# Patient Record
Sex: FEMALE | Race: WHITE | NOT HISPANIC OR LATINO | URBAN - METROPOLITAN AREA
[De-identification: names, ages, dates, MRNs, and addresses within clinical notes are randomized per-mention and may not be internally consistent; named-entity substitution may affect disease eponyms.]

---

## 2017-06-20 ENCOUNTER — OUTPATIENT (OUTPATIENT)
Dept: OUTPATIENT SERVICES | Facility: HOSPITAL | Age: 72
LOS: 1 days | End: 2017-06-20

## 2018-12-08 ENCOUNTER — TRANSCRIPTION ENCOUNTER (OUTPATIENT)
Age: 73
End: 2018-12-08

## 2023-07-12 PROBLEM — Z00.00 ENCOUNTER FOR PREVENTIVE HEALTH EXAMINATION: Status: ACTIVE | Noted: 2023-07-12

## 2023-08-04 ENCOUNTER — APPOINTMENT (OUTPATIENT)
Dept: ORTHOPEDIC SURGERY | Facility: CLINIC | Age: 78
End: 2023-08-04
Payer: MEDICARE

## 2023-08-04 VITALS — WEIGHT: 220 LBS | HEIGHT: 67 IN | BODY MASS INDEX: 34.53 KG/M2

## 2023-08-04 DIAGNOSIS — Q99.8 OTHER SPECIFIED CHROMOSOME ABNORMALITIES: ICD-10-CM

## 2023-08-04 DIAGNOSIS — Z86.69 PERSONAL HISTORY OF OTHER DISEASES OF THE NERVOUS SYSTEM AND SENSE ORGANS: ICD-10-CM

## 2023-08-04 DIAGNOSIS — G21.9 SECONDARY PARKINSONISM, UNSPECIFIED: ICD-10-CM

## 2023-08-04 PROCEDURE — 99213 OFFICE O/P EST LOW 20 MIN: CPT

## 2023-08-04 PROCEDURE — 73564 X-RAY EXAM KNEE 4 OR MORE: CPT | Mod: RT

## 2023-08-04 RX ORDER — RASAGILINE 1 MG/1
1 TABLET ORAL
Refills: 0 | Status: ACTIVE | COMMUNITY

## 2023-08-04 RX ORDER — CARBIDOPA AND LEVODOPA 25; 100 MG/1; MG/1
25-100 TABLET ORAL
Refills: 0 | Status: ACTIVE | COMMUNITY

## 2023-08-04 RX ORDER — PRAMIPEXOLE DIHYDROCHLORIDE 0.5 MG/1
0.5 TABLET ORAL
Refills: 0 | Status: ACTIVE | COMMUNITY

## 2023-08-04 NOTE — IMAGING
[Right] : right knee [All Views] : anteroposterior, lateral, skyline, and anteroposterior standing [Moderate tricompartmental OA medial narrowing] : Moderate tricompartmental OA medial narrowing

## 2023-08-04 NOTE — HISTORY OF PRESENT ILLNESS
[Right Leg] : right leg [Gradual] : gradual [7] : 7 [5] : 5 [Localized] : localized [Intermittent] : intermittent [Household chores] : household chores [Rest] : rest [Walking] : walking [] : yes [de-identified] : 08/04/23 PT PRESENTS HERE TODAY WITH RIGHT KNEE PAIN  PT IS  IN A WHEEL CHAIR    KNEE FREDY  WEARING MEDIAL  BRACE WHICH HELP  NO RECENT INJURY  [FreeTextEntry1] : KNEE  [FreeTextEntry5] : NO INJURY  [FreeTextEntry6] : WEAKNESS [de-identified] : DR ELLIOTT  [de-identified] : X RAYS DONE AT [facility] 2021 [de-identified] : BRACE

## 2023-08-04 NOTE — ASSESSMENT
[FreeTextEntry1] : 78 year F WITH MODERATE RT KNEE PAIN. HAS HAD MULTIPLE CSI AND GEL INJECTIONS WITH MINIMAL RELIEF. AMBULATES WITH A WHEELCHAIR. SHE IS WEARING MEDIAL UNLOADING BRACE. PAIN IS AFFECTING ADL AND FUNCTIONAL ACTIVITIES. XRAYS REVIEWED WITH MODERATE MEDIAL OA. TREATMENT OPTIONS REVIEWED. QUESTIONS ANSWERED. POSSIBLE GENICULAR NERVE ABLATION DISCUSSED. WILL FOLLOW UP WITH PAIN MANAGEMENT TO DISCUSS.  PMHx: PARKINSON'S DISEASE,

## 2023-08-04 NOTE — PHYSICAL EXAM
[Right] : right knee [] : uses wheelchair [TWNoteComboBox7] : flexion 120 degrees [de-identified] : extension 3 degrees

## 2023-09-12 ENCOUNTER — APPOINTMENT (OUTPATIENT)
Dept: PAIN MANAGEMENT | Facility: CLINIC | Age: 78
End: 2023-09-12
Payer: MEDICARE

## 2023-09-12 VITALS — HEIGHT: 67 IN | BODY MASS INDEX: 35.47 KG/M2 | WEIGHT: 226 LBS

## 2023-09-12 PROCEDURE — 99204 OFFICE O/P NEW MOD 45 MIN: CPT

## 2023-09-22 ENCOUNTER — APPOINTMENT (OUTPATIENT)
Age: 78
End: 2023-09-22
Payer: MEDICARE

## 2023-09-22 PROCEDURE — 64450 NJX AA&/STRD OTHER PN/BRANCH: CPT | Mod: RT

## 2023-10-04 ENCOUNTER — APPOINTMENT (OUTPATIENT)
Dept: PAIN MANAGEMENT | Facility: CLINIC | Age: 78
End: 2023-10-04
Payer: MEDICARE

## 2023-10-04 VITALS — HEIGHT: 67 IN | BODY MASS INDEX: 33.74 KG/M2 | WEIGHT: 215 LBS

## 2023-10-04 DIAGNOSIS — M17.11 UNILATERAL PRIMARY OSTEOARTHRITIS, RIGHT KNEE: ICD-10-CM

## 2023-10-04 PROCEDURE — 99214 OFFICE O/P EST MOD 30 MIN: CPT
